# Patient Record
Sex: MALE | Race: WHITE | NOT HISPANIC OR LATINO | Employment: FULL TIME | URBAN - METROPOLITAN AREA
[De-identification: names, ages, dates, MRNs, and addresses within clinical notes are randomized per-mention and may not be internally consistent; named-entity substitution may affect disease eponyms.]

---

## 2023-09-15 PROBLEM — E78.00 ELEVATED CHOLESTEROL: Status: ACTIVE | Noted: 2023-09-15

## 2023-09-15 PROBLEM — R29.810 FACIAL WEAKNESS: Status: ACTIVE | Noted: 2023-09-15

## 2023-09-15 PROBLEM — E10.649 HYPOGLYCEMIA UNAWARENESS ASSOCIATED WITH TYPE 1 DIABETES MELLITUS (MULTI): Status: ACTIVE | Noted: 2023-09-15

## 2023-09-15 PROBLEM — F17.200 CURRENT SMOKER: Status: ACTIVE | Noted: 2023-09-15

## 2023-09-15 PROBLEM — E06.3 AUTOIMMUNE THYROIDITIS: Status: ACTIVE | Noted: 2023-09-15

## 2023-09-15 PROBLEM — E66.8 OTHER OBESITY: Status: ACTIVE | Noted: 2023-09-15

## 2023-09-15 PROBLEM — E10.42 TYPE 1 DIABETES MELLITUS WITH DIABETIC POLYNEUROPATHY (MULTI): Status: ACTIVE | Noted: 2023-09-15

## 2023-09-15 PROBLEM — R20.0 FACIAL NUMBNESS: Status: ACTIVE | Noted: 2023-09-15

## 2023-09-15 PROBLEM — E66.89 OTHER OBESITY: Status: ACTIVE | Noted: 2023-09-15

## 2023-09-15 RX ORDER — INSULIN ASPART 100 [IU]/ML
INJECTION, SOLUTION INTRAVENOUS; SUBCUTANEOUS
COMMUNITY
Start: 2021-08-11 | End: 2023-10-02 | Stop reason: CLARIF

## 2023-09-15 RX ORDER — NAPROXEN SODIUM 220 MG/1
TABLET, FILM COATED ORAL
COMMUNITY

## 2023-09-15 RX ORDER — BLOOD-GLUCOSE TRANSMITTER
EACH MISCELLANEOUS
COMMUNITY
Start: 2023-04-04 | End: 2024-04-01

## 2023-09-15 RX ORDER — LEVOTHYROXINE SODIUM 200 UG/1
TABLET ORAL
COMMUNITY
Start: 2023-01-27

## 2023-09-15 RX ORDER — BENAZEPRIL HYDROCHLORIDE 10 MG/1
TABLET ORAL
COMMUNITY

## 2023-09-15 RX ORDER — SILDENAFIL 100 MG/1
TABLET, FILM COATED ORAL
COMMUNITY

## 2023-09-15 RX ORDER — BLOOD-GLUCOSE SENSOR
EACH MISCELLANEOUS
COMMUNITY
Start: 2023-04-04

## 2023-09-15 RX ORDER — INSULIN DEGLUDEC 200 U/ML
INJECTION, SOLUTION SUBCUTANEOUS
COMMUNITY
End: 2024-01-08 | Stop reason: WASHOUT

## 2023-09-15 RX ORDER — ATORVASTATIN CALCIUM 40 MG/1
TABLET, FILM COATED ORAL
COMMUNITY
End: 2024-01-02

## 2023-09-15 RX ORDER — BLOOD-GLUCOSE SENSOR
EACH MISCELLANEOUS
COMMUNITY
Start: 2023-04-10 | End: 2024-04-29

## 2023-09-15 RX ORDER — BLOOD-GLUCOSE,RECEIVER,CONT
EACH MISCELLANEOUS
COMMUNITY
Start: 2023-03-30 | End: 2024-01-08 | Stop reason: WASHOUT

## 2023-10-01 DIAGNOSIS — Z79.4 TYPE 2 DIABETES MELLITUS WITH OTHER SPECIFIED COMPLICATION, WITH LONG-TERM CURRENT USE OF INSULIN (MULTI): Primary | ICD-10-CM

## 2023-10-01 DIAGNOSIS — E11.69 TYPE 2 DIABETES MELLITUS WITH OTHER SPECIFIED COMPLICATION, WITH LONG-TERM CURRENT USE OF INSULIN (MULTI): Primary | ICD-10-CM

## 2023-10-02 RX ORDER — INSULIN ASPART 100 [IU]/ML
INJECTION, SOLUTION INTRAVENOUS; SUBCUTANEOUS
Qty: 30 ML | Refills: 5 | Status: SHIPPED | OUTPATIENT
Start: 2023-10-02 | End: 2023-10-08 | Stop reason: CLARIF

## 2023-10-07 DIAGNOSIS — E10.42 TYPE 1 DIABETES MELLITUS WITH DIABETIC POLYNEUROPATHY (MULTI): Primary | ICD-10-CM

## 2023-10-08 RX ORDER — INSULIN ASPART 100 [IU]/ML
INJECTION, SOLUTION INTRAVENOUS; SUBCUTANEOUS
Qty: 15 ML | Refills: 7 | Status: SHIPPED | OUTPATIENT
Start: 2023-10-08 | End: 2023-10-13 | Stop reason: ALTCHOICE

## 2023-10-13 DIAGNOSIS — E10.42 TYPE 1 DIABETES MELLITUS WITH DIABETIC POLYNEUROPATHY (MULTI): ICD-10-CM

## 2023-10-13 DIAGNOSIS — E10.42 TYPE 1 DIABETES MELLITUS WITH DIABETIC POLYNEUROPATHY (MULTI): Primary | ICD-10-CM

## 2023-10-13 RX ORDER — INSULIN LISPRO 100 [IU]/ML
INJECTION, SOLUTION INTRAVENOUS; SUBCUTANEOUS
Qty: 30 ML | Refills: 3 | Status: SHIPPED | OUTPATIENT
Start: 2023-10-13 | End: 2023-10-13

## 2023-10-13 RX ORDER — INSULIN LISPRO 100 [IU]/ML
INJECTION, SOLUTION INTRAVENOUS; SUBCUTANEOUS
Qty: 30 ML | Refills: 3 | Status: SHIPPED | OUTPATIENT
Start: 2023-10-13

## 2023-12-29 DIAGNOSIS — E78.5 HYPERLIPIDEMIA, UNSPECIFIED HYPERLIPIDEMIA TYPE: Primary | ICD-10-CM

## 2024-01-02 RX ORDER — ATORVASTATIN CALCIUM 40 MG/1
40 TABLET, FILM COATED ORAL DAILY
Qty: 90 TABLET | Refills: 1 | Status: SHIPPED | OUTPATIENT
Start: 2024-01-02

## 2024-01-03 NOTE — PROGRESS NOTES
HPI:  Here for follow up/metabolic management 49 yo with DM Type 1 diagnosed 2008. History HTN, ED, Hypothyroid currently taking 200 mcg 7 pills a week, current smoker. A1C 9.5% was 9.2% today. CGM data suggests lower A1c. Patient testing sugars 6 times a day with Dexcom G6 sensor/Tandem Tslim pump. Struggles following carb controlled diet and knows reasonable carb allowances, plans to start diet competition at work with healthier lunches.  Patient able to afford their medications. 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           Tandem t-slim with Dexcom G6 cgm with Control IQ         Basal: 12a 1.37 u/hr (32.9 units)         ICR:         12a 1:6         4am 1:5         9am 1:6         6pm 1:5         Target 110         ISF: 12a 28           - Benazepril daily at goal         - Atorvastatin 40 mg daily LDL 99           Tconnect report downloaded and attached time in target 63% (was 42%), lows 2%. Avg bs 166. Patterns: high 100's overnight, waking mid 100's, low 200's after lunch and dinner, upper 100's bedtime. Continues to add carbs (not eating) to correct elevations.    TDD:   87.77 units  Sleep mode: set  Exercise mode: not using, reviewed use  Infusion sets: alfredo steel, rotating, denies problems with sites  -has back up basal insulin at home  -pump failure protocol reviewed  -reinforced treating with 5-10 grams of fasting acting carbs for hypoglycemia  -reviewed use of extended bolus     The pros and cons, risks and benefits and mechanism of action of the GLP-1 mimetics were discussed with the patient.   The patient was instructed to not eat if not hungry.   The patient was instructed on self-administration of the injected GLP-1 mimetic.     /75 (BP Location: Right arm, Patient Position: Sitting, BP Cuff Size: Large adult)   Pulse 79   Wt 119 kg (261 lb 9.6 oz)   BMI 35.48 kg/m²     Current Outpatient Medications:     aspirin 81 mg chewable tablet, , Disp: , Rfl:     atorvastatin  (Lipitor) 40 mg tablet, TAKE 1 TABLET ONCE DAILY, Disp: 90 tablet, Rfl: 1    benazepril (Lotensin) 10 mg tablet, , Disp: , Rfl:     blood-glucose sensor (Dexcom G6 Sensor) device, , Disp: , Rfl:     Dexcom G4 platinum transmitter (Dexcom G6 Transmitter) device, , Disp: , Rfl:     ergocalciferol, vitamin D2, (VITAMIN D2 ORAL), , Disp: , Rfl:     insulin lispro (HumaLOG) 100 unit/mL injection, TAKE AS DIRECTED PER INSULIN INSTRUCTIONS., Disp: 30 mL, Rfl: 3    levothyroxine (Synthroid) 200 mcg tablet, , Disp: , Rfl:     sildenafil (Viagra) 100 mg tablet, , Disp: , Rfl:     blood-glucose sensor (Dexcom G7 Sensor) device, , Disp: , Rfl:   Labs:  Lab Results   Component Value Date    WBC 11.5 (H) 03/15/2023    NRBC 0 03/15/2023    RBC 5.10 03/15/2023    HGB 16.0 03/15/2023    HCT 47.4 03/15/2023     03/15/2023     Lab Results   Component Value Date    CALCIUM 9.2 03/15/2023    AST 13 03/15/2023    ALKPHOS 147 (H) 03/15/2023    BILITOT 0.5 03/15/2023    PROT 7.0 03/15/2023    ALBUMIN 3.8 03/15/2023    GLOB 3.2 03/15/2023    AGR 1.2 (L) 03/15/2023     03/15/2023    K 4.4 03/15/2023     03/15/2023    CO2 23 (L) 03/15/2023    ANIONGAP 14 03/15/2023    BUN 13 03/15/2023    CREATININE 0.7 03/15/2023    UREACREAUR 18.6 03/15/2023    GLUCOSE 140 (H) 03/15/2023    ALT 17 03/15/2023    EGFR 114 03/15/2023     Lab Results   Component Value Date    CHOL 149 03/15/2023    TRIG 67 03/15/2023    HDL 37 (L) 03/15/2023    LDLCALC 99 03/15/2023     Lab Results   Component Value Date    MICROALBCREA 19.7 03/15/2023     Lab Results   Component Value Date    TSH 1.93 03/15/2023         Assessment and Plan:    1. Type 1 diabetes mellitus with diabetic polyneuropathy (CMS/HCC)  -postprandial hyperglycemia: increase carb ratio 9 am to 1:5, increase carb ratio 6 pm to 4.5  -not coming to target with current correction, increase CF to 20  -reinforced carb limits at meals  -reinforced allowing pump to correct blood sugars, avoid  entering carbs for correction  -reinforced smoking cessation  -start mounjaro for weight management, CV risk reduction    2. Elevated cholesterol  -on statin and toleraing    3. Autoimmune thyroiditis  -euthyroid on current meds    4. Primary hypertension  -at target on current therapy    Medical Decision Making  Complexity of problem: Chronic illness of diabetes mellitus uncontrolled, progressing  Data analyzed and reviewed: Reviewed prior notes, blood glucose data, labs including HgbA1c, lipids, serum chemistries.  Ordered tests.   Risk of complications and morbidities: Is definite because of use of insulin and risk of hypoglycemia.  Prescription medications reviewed and modifications made.  Compliance assessed.  Addressed social determinants of health including food insecurity.      Treatment and plan discussed with Dr. Still. Jennifer OCHOA Certified Diabetes [facility] and

## 2024-01-08 ENCOUNTER — CLINICAL SUPPORT (OUTPATIENT)
Dept: ENDOCRINOLOGY | Facility: CLINIC | Age: 49
End: 2024-01-08
Payer: COMMERCIAL

## 2024-01-08 VITALS
WEIGHT: 261.6 LBS | DIASTOLIC BLOOD PRESSURE: 75 MMHG | BODY MASS INDEX: 35.48 KG/M2 | SYSTOLIC BLOOD PRESSURE: 122 MMHG | HEART RATE: 79 BPM

## 2024-01-08 DIAGNOSIS — E10.42 TYPE 1 DIABETES MELLITUS WITH DIABETIC POLYNEUROPATHY (MULTI): Primary | ICD-10-CM

## 2024-01-08 DIAGNOSIS — I10 PRIMARY HYPERTENSION: ICD-10-CM

## 2024-01-08 DIAGNOSIS — E06.3 AUTOIMMUNE THYROIDITIS: ICD-10-CM

## 2024-01-08 DIAGNOSIS — E78.00 ELEVATED CHOLESTEROL: ICD-10-CM

## 2024-01-08 LAB — POC HEMOGLOBIN A1C: 9.5 % (ref 4.2–6.5)

## 2024-01-08 PROCEDURE — 83036 HEMOGLOBIN GLYCOSYLATED A1C: CPT | Performed by: INTERNAL MEDICINE

## 2024-01-08 PROCEDURE — 99214 OFFICE O/P EST MOD 30 MIN: CPT | Performed by: INTERNAL MEDICINE

## 2024-01-08 PROCEDURE — 95251 CONT GLUC MNTR ANALYSIS I&R: CPT | Performed by: INTERNAL MEDICINE

## 2024-01-08 RX ORDER — INSULIN ASPART 100 [IU]/ML
INJECTION, SOLUTION INTRAVENOUS; SUBCUTANEOUS
Qty: 90 ML | Refills: 1 | Status: SHIPPED | OUTPATIENT
Start: 2024-01-08

## 2024-01-08 RX ORDER — TIRZEPATIDE 5 MG/.5ML
5 INJECTION, SOLUTION SUBCUTANEOUS
Qty: 2 ML | Refills: 5 | Status: SHIPPED | OUTPATIENT
Start: 2024-01-08

## 2024-01-08 RX ORDER — TIRZEPATIDE 2.5 MG/.5ML
2.5 INJECTION, SOLUTION SUBCUTANEOUS
Qty: 2 ML | Refills: 0 | Status: SHIPPED | OUTPATIENT
Start: 2024-01-08 | End: 2024-02-06

## 2024-01-08 ASSESSMENT — PAIN SCALES - GENERAL: PAINLEVEL: 0-NO PAIN

## 2024-01-08 NOTE — PATIENT INSTRUCTIONS
See pump settings page for changes we made today    Start  Mounjaro 2.5 mg weekly for 4 weeks  Then   Mounjaro 5mg weekly    Let the pump correct you when high, avoid entering carbs for correction

## 2024-01-08 NOTE — PROGRESS NOTES
I, Dr Girish Still, have reviewed this progress note, medication list, vital signs, any pertinent lab values, and any CGM data if present with the Certified Diabetes Care and  face to face during this visit today. This note reflects the treatment plan that was made under my direction after reviewing the above mentioned elements while face to face with the patient and CDE.  I personally answered and addressed any questions and concerns the patient had during the visit today.  The CDE entered the data in this note under my direction and I personally reviewed it, signed any lab or medication orders that I instructed to be completed. I am the billing provider for this visit and the level of service was determined by my involvement in the Medical Decision Making Component of this visit while face to face with the patient.    Girish Still MD

## 2024-01-30 DIAGNOSIS — E10.42 TYPE 1 DIABETES MELLITUS WITH DIABETIC POLYNEUROPATHY (MULTI): Primary | ICD-10-CM

## 2024-01-30 RX ORDER — INSULIN ASPART 100 [IU]/ML
INJECTION, SOLUTION INTRAVENOUS; SUBCUTANEOUS
Qty: 30 ML | Refills: 11 | Status: SHIPPED | OUTPATIENT
Start: 2024-01-30 | End: 2024-02-05

## 2024-02-05 DIAGNOSIS — E10.42 TYPE 1 DIABETES MELLITUS WITH DIABETIC POLYNEUROPATHY (MULTI): ICD-10-CM

## 2024-02-05 RX ORDER — INSULIN ASPART 100 [IU]/ML
INJECTION, SOLUTION INTRAVENOUS; SUBCUTANEOUS
Qty: 60 ML | Refills: 11 | Status: SHIPPED | OUTPATIENT
Start: 2024-02-05

## 2024-02-06 DIAGNOSIS — E10.42 TYPE 1 DIABETES MELLITUS WITH DIABETIC POLYNEUROPATHY (MULTI): ICD-10-CM

## 2024-02-06 RX ORDER — TIRZEPATIDE 2.5 MG/.5ML
2.5 INJECTION, SOLUTION SUBCUTANEOUS
Qty: 2 ML | Refills: 6 | Status: SHIPPED | OUTPATIENT
Start: 2024-02-06

## 2024-03-30 DIAGNOSIS — E10.42 TYPE 1 DIABETES MELLITUS WITH DIABETIC POLYNEUROPATHY (MULTI): Primary | ICD-10-CM

## 2024-04-01 RX ORDER — BLOOD-GLUCOSE TRANSMITTER
EACH MISCELLANEOUS
Qty: 1 EACH | Refills: 3 | Status: SHIPPED | OUTPATIENT
Start: 2024-04-01

## 2024-04-12 ENCOUNTER — TELEPHONE (OUTPATIENT)
Dept: ENDOCRINOLOGY | Facility: CLINIC | Age: 49
End: 2024-04-12

## 2024-04-15 ENCOUNTER — APPOINTMENT (OUTPATIENT)
Dept: ENDOCRINOLOGY | Facility: CLINIC | Age: 49
End: 2024-04-15

## 2024-04-27 DIAGNOSIS — E10.42 TYPE 1 DIABETES MELLITUS WITH DIABETIC POLYNEUROPATHY (MULTI): Primary | ICD-10-CM

## 2024-04-29 RX ORDER — BLOOD-GLUCOSE SENSOR
EACH MISCELLANEOUS
Qty: 10 EACH | Refills: 2 | Status: SHIPPED | OUTPATIENT
Start: 2024-04-29

## 2024-07-13 DIAGNOSIS — E10.42 TYPE 1 DIABETES MELLITUS WITH DIABETIC POLYNEUROPATHY (MULTI): ICD-10-CM

## 2024-07-15 DIAGNOSIS — E10.42 TYPE 1 DIABETES MELLITUS WITH DIABETIC POLYNEUROPATHY (MULTI): ICD-10-CM

## 2024-07-15 RX ORDER — BLOOD-GLUCOSE SENSOR
EACH MISCELLANEOUS
Qty: 9 EACH | Refills: 3 | Status: SHIPPED | OUTPATIENT
Start: 2024-07-15

## 2024-07-15 RX ORDER — INSULIN ASPART 100 [IU]/ML
INJECTION, SOLUTION INTRAVENOUS; SUBCUTANEOUS
Qty: 30 ML | Refills: 5 | Status: SHIPPED | OUTPATIENT
Start: 2024-07-15

## 2024-07-26 RX ORDER — BENAZEPRIL HYDROCHLORIDE 10 MG/1
10 TABLET ORAL DAILY
Qty: 90 TABLET | Refills: 3 | OUTPATIENT
Start: 2024-07-26

## 2024-08-27 DIAGNOSIS — E78.5 HYPERLIPIDEMIA, UNSPECIFIED HYPERLIPIDEMIA TYPE: ICD-10-CM

## 2024-08-27 RX ORDER — LEVOTHYROXINE SODIUM 200 UG/1
TABLET ORAL
Qty: 90 TABLET | Refills: 3 | OUTPATIENT
Start: 2024-08-27

## 2024-08-27 RX ORDER — ATORVASTATIN CALCIUM 40 MG/1
40 TABLET, FILM COATED ORAL DAILY
Qty: 90 TABLET | Refills: 1 | Status: SHIPPED | OUTPATIENT
Start: 2024-08-27

## 2024-09-12 DIAGNOSIS — E06.3 AUTOIMMUNE THYROIDITIS: Primary | ICD-10-CM

## 2024-09-12 RX ORDER — LEVOTHYROXINE SODIUM 200 UG/1
TABLET ORAL
Qty: 90 TABLET | Refills: 3 | Status: SHIPPED | OUTPATIENT
Start: 2024-09-12

## 2024-11-06 NOTE — PROGRESS NOTES
HPI   48 yo with DM Type 1 diagnosed 2008. History HTN, ED, current smoker. A1C 9.2% last visit, today 8.5% .     Patient testing sugars 6 times a day with Dexcom G6 sensor/Tandem Tslim pump. Improving following carb controlled diet and knows reasonable carb allowances.  Patient able to afford their medications. 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            Tandem t-slim with Dexcom G6 cgm with Control IQ         Basal: 12a 1.37 u/hr          ICR:         12a 1:6         4am 1:5         9am 1:5         6pm 1:4.5         Target 110         ISF: 12a 20    -gave trial of mounjaro-had gi side effects      30 day dexcom data: 66% in range, 4% low, pattern: mid/upper 100's through the day, upper 100's on waking and through most of the day.  -going out for most lunches  -not always bolusing at meals          - Benazepril  10mg daily at goal           - Atorvastatin 40 mg daily     -Hypothyroid currently taking 200 mcg 7 pills a week                /75 (BP Location: Right arm, Patient Position: Sitting, BP Cuff Size: Large adult)   Pulse 79   Wt 119 kg (261 lb 9.6 oz)   BMI 35.48 kg/m²           Current Outpatient Medications:     aspirin 81 mg chewable tablet, , Disp: , Rfl:     atorvastatin (Lipitor) 40 mg tablet, TAKE 1 TABLET ONCE DAILY, Disp: 90 tablet, Rfl: 1    blood-glucose sensor (Dexcom G6 Sensor) device, USE AS DIRECTED, Disp: 9 each, Rfl: 3    blood-glucose sensor (Dexcom G7 Sensor) device, , Disp: , Rfl:     blood-glucose transmitter device (Dexcom G6 Transmitter) device, AS DIRECTED EVERY 90 DAYS, Disp: 1 each, Rfl: 3    ergocalciferol, vitamin D2, (VITAMIN D2 ORAL), , Disp: , Rfl:     sildenafil (Viagra) 100 mg tablet, , Disp: , Rfl:     Synthroid 200 mcg tablet, TAKE 1 TABLET ONCE DAILY INTHE MORNING ON AN EMPTY    STOMACH, Disp: 90 tablet, Rfl: 3    benazepril (Lotensin) 10 mg tablet, Take 1 tablet (10 mg) by mouth once daily., Disp: 90 tablet, Rfl: 3    insulin aspart  "(NovoLOG U-100 Insulin aspart) 100 unit/mL injection, TAKE AS DIRECTED UP  UNITS DAILY USING INSULIN PUMP, Disp: 90 mL, Rfl: 3    tirzepatide (Mounjaro) 2.5 mg/0.5 mL pen injector, INJECT 2.5 MG SUBCUTANEOUSLY EVERY 7 DAYS (Patient not taking: Reported on 11/7/2024), Disp: 2 mL, Rfl: 6    tirzepatide (Mounjaro) 5 mg/0.5 mL pen injector, Inject 5 mg under the skin 1 (one) time per week. (Patient not taking: Reported on 11/7/2024), Disp: 2 mL, Rfl: 5      Allergies as of 11/07/2024    (No Known Allergies)         Review of Systems   Cardiology: Lightheadedness-denies.  Chest pain-denies.  Leg edema-denies.  Palpitations-denies.  Respiratory: Cough-denies. Shortness of breath-denies.  Wheezing-denies.  Gastroenterology: Constipation-denies.  Diarrhea-denies.  Heartburn-denies.  Endocrinology: Cold intolerance-denies.  Heat intolerance-denies.  Sweats-denies.  Neurology: Headache-denies.  Tremor-denies.  Neuropathy in extremities-denies.  Psychology: Low energy-denies.  Irritability-denies.  Sleep disturbances-denies.      /74   Pulse 72   Ht 1.88 m (6' 2\")   Wt 114 kg (252 lb)   BMI 32.35 kg/m²       Labs:  Lab Results   Component Value Date    WBC 11.5 (H) 03/15/2023    NRBC 0 03/15/2023    RBC 5.10 03/15/2023    HGB 16.0 03/15/2023    HCT 47.4 03/15/2023     03/15/2023     Lab Results   Component Value Date    CALCIUM 9.2 03/15/2023    AST 13 03/15/2023    ALKPHOS 147 (H) 03/15/2023    BILITOT 0.5 03/15/2023    PROT 7.0 03/15/2023    ALBUMIN 3.8 03/15/2023    GLOB 3.2 03/15/2023    AGR 1.2 (L) 03/15/2023     03/15/2023    K 4.4 03/15/2023     03/15/2023    CO2 23 (L) 03/15/2023    ANIONGAP 14 03/15/2023    BUN 13 03/15/2023    CREATININE 0.7 03/15/2023    UREACREAUR 18.6 03/15/2023    GLUCOSE 140 (H) 03/15/2023    ALT 17 03/15/2023    EGFR 114 03/15/2023     Lab Results   Component Value Date    CHOL 149 03/15/2023    TRIG 67 03/15/2023    HDL 37 (L) 03/15/2023    LDLCALC 99 " "03/15/2023     Lab Results   Component Value Date    MICROALBCREA 19.7 03/15/2023     Lab Results   Component Value Date    TSH 1.93 03/15/2023     No results found for: \"[id number]\"  Lab Results   Component Value Date    HGBA1C 8.5 (A) 11/07/2024         Physical Exam   General Appearance: pleasant, cooperative, no acute distress  HEENT: no chemosis, no proptosis, no lid lag, no lid retraction  Neck: no lymphadenopathy, no thyromegaly, no dominant thyroid nodules  Heart: no murmurs, regular rate and rhythm, S1 and S2  Lungs: no wheezes, no rhonci, no rales  Extremities: no lower extremity swelling      Assessment/Plan   1. Type 1 diabetes mellitus with diabetic polyneuropathy (Primary)  -A1c ordered and reviewed  -dexcom data reviewed  -labs reviewed/labs ordered    -needs to bolus with meals  -would be better if he packed lunch  -overall settings are reasonable    2. Elevated cholesterol  -on statin and tolerating, repeat labs    3. Autoimmune thyroiditis  -euthyroid on therapy, please repeat labs    4. Primary hypertension  -at target on therapy, will follow, no change in meds         Follow Up:  nestor Kruse 4 months    Medical Decision Making  Complexity of problem: Chronic illness of diabetes mellitus uncontrolled, progressing  Data analyzed and reviewed: Reviewed prior notes, blood glucose data, labs including HgbA1c, lipids, serum chemistries.  Ordered tests.   Risk of complications and morbidities: Is definite because of use of insulin and risk of hypoglycemia.  Prescription medications reviewed and modifications made.  Compliance assessed.  Addressed social determinants of health including food insecurity.       "

## 2024-11-07 ENCOUNTER — APPOINTMENT (OUTPATIENT)
Dept: ENDOCRINOLOGY | Facility: CLINIC | Age: 49
End: 2024-11-07

## 2024-11-07 VITALS
BODY MASS INDEX: 32.34 KG/M2 | SYSTOLIC BLOOD PRESSURE: 122 MMHG | HEIGHT: 74 IN | DIASTOLIC BLOOD PRESSURE: 74 MMHG | WEIGHT: 252 LBS | HEART RATE: 72 BPM

## 2024-11-07 DIAGNOSIS — I10 PRIMARY HYPERTENSION: ICD-10-CM

## 2024-11-07 DIAGNOSIS — E06.3 AUTOIMMUNE THYROIDITIS: ICD-10-CM

## 2024-11-07 DIAGNOSIS — E10.42 TYPE 1 DIABETES MELLITUS WITH DIABETIC POLYNEUROPATHY: Primary | ICD-10-CM

## 2024-11-07 DIAGNOSIS — E78.00 ELEVATED CHOLESTEROL: ICD-10-CM

## 2024-11-07 LAB — POC HEMOGLOBIN A1C: 8.5 % (ref 4.2–6.5)

## 2024-11-07 PROCEDURE — 3074F SYST BP LT 130 MM HG: CPT | Performed by: INTERNAL MEDICINE

## 2024-11-07 PROCEDURE — 99214 OFFICE O/P EST MOD 30 MIN: CPT | Performed by: INTERNAL MEDICINE

## 2024-11-07 PROCEDURE — 3078F DIAST BP <80 MM HG: CPT | Performed by: INTERNAL MEDICINE

## 2024-11-07 PROCEDURE — 3008F BODY MASS INDEX DOCD: CPT | Performed by: INTERNAL MEDICINE

## 2024-11-07 PROCEDURE — 4004F PT TOBACCO SCREEN RCVD TLK: CPT | Performed by: INTERNAL MEDICINE

## 2024-11-07 PROCEDURE — 83036 HEMOGLOBIN GLYCOSYLATED A1C: CPT | Performed by: INTERNAL MEDICINE

## 2024-11-07 PROCEDURE — 4010F ACE/ARB THERAPY RXD/TAKEN: CPT | Performed by: INTERNAL MEDICINE

## 2024-11-07 RX ORDER — INSULIN ASPART 100 [IU]/ML
INJECTION, SOLUTION INTRAVENOUS; SUBCUTANEOUS
Qty: 90 ML | Refills: 3 | Status: SHIPPED | OUTPATIENT
Start: 2024-11-07

## 2024-11-07 RX ORDER — BENAZEPRIL HYDROCHLORIDE 10 MG/1
10 TABLET ORAL DAILY
Qty: 90 TABLET | Refills: 3 | Status: SHIPPED | OUTPATIENT
Start: 2024-11-07

## 2024-11-07 ASSESSMENT — ENCOUNTER SYMPTOMS
DEPRESSION: 0
OCCASIONAL FEELINGS OF UNSTEADINESS: 0
LOSS OF SENSATION IN FEET: 0

## 2024-11-07 ASSESSMENT — LIFESTYLE VARIABLES
AUDIT-C TOTAL SCORE: 0
HOW OFTEN DO YOU HAVE SIX OR MORE DRINKS ON ONE OCCASION: NEVER
SKIP TO QUESTIONS 9-10: 1
HOW OFTEN DO YOU HAVE A DRINK CONTAINING ALCOHOL: NEVER
HOW MANY STANDARD DRINKS CONTAINING ALCOHOL DO YOU HAVE ON A TYPICAL DAY: PATIENT DOES NOT DRINK

## 2024-11-07 ASSESSMENT — PATIENT HEALTH QUESTIONNAIRE - PHQ9
1. LITTLE INTEREST OR PLEASURE IN DOING THINGS: NOT AT ALL
SUM OF ALL RESPONSES TO PHQ9 QUESTIONS 1 & 2: 0
2. FEELING DOWN, DEPRESSED OR HOPELESS: NOT AT ALL

## 2024-11-07 ASSESSMENT — PAIN SCALES - GENERAL: PAINLEVEL_OUTOF10: 0-NO PAIN

## 2024-11-08 ENCOUNTER — LAB (OUTPATIENT)
Dept: LAB | Facility: LAB | Age: 49
End: 2024-11-08
Payer: COMMERCIAL

## 2024-11-08 DIAGNOSIS — E78.00 ELEVATED CHOLESTEROL: ICD-10-CM

## 2024-11-08 DIAGNOSIS — E10.42 TYPE 1 DIABETES MELLITUS WITH DIABETIC POLYNEUROPATHY: ICD-10-CM

## 2024-11-08 DIAGNOSIS — E06.3 AUTOIMMUNE THYROIDITIS: ICD-10-CM

## 2024-11-08 LAB
ALBUMIN SERPL BCP-MCNC: 3.9 G/DL (ref 3.4–5)
ALP SERPL-CCNC: 136 U/L (ref 33–120)
ALT SERPL W P-5'-P-CCNC: 19 U/L (ref 10–52)
ANION GAP SERPL CALC-SCNC: 10 MMOL/L (ref 10–20)
AST SERPL W P-5'-P-CCNC: 17 U/L (ref 9–39)
BASOPHILS # BLD AUTO: 0.06 X10*3/UL (ref 0–0.1)
BASOPHILS NFR BLD AUTO: 0.7 %
BILIRUB SERPL-MCNC: 0.4 MG/DL (ref 0–1.2)
BUN SERPL-MCNC: 12 MG/DL (ref 6–23)
CALCIUM SERPL-MCNC: 9.1 MG/DL (ref 8.6–10.3)
CHLORIDE SERPL-SCNC: 106 MMOL/L (ref 98–107)
CHOLEST SERPL-MCNC: 139 MG/DL (ref 0–199)
CHOLESTEROL/HDL RATIO: 3.5
CO2 SERPL-SCNC: 29 MMOL/L (ref 21–32)
CREAT SERPL-MCNC: 0.81 MG/DL (ref 0.5–1.3)
CREAT UR-MCNC: 167.8 MG/DL (ref 20–370)
EGFRCR SERPLBLD CKD-EPI 2021: >90 ML/MIN/1.73M*2
EOSINOPHIL # BLD AUTO: 0.15 X10*3/UL (ref 0–0.7)
EOSINOPHIL NFR BLD AUTO: 1.8 %
ERYTHROCYTE [DISTWIDTH] IN BLOOD BY AUTOMATED COUNT: 12.8 % (ref 11.5–14.5)
GLUCOSE SERPL-MCNC: 155 MG/DL (ref 74–99)
HCT VFR BLD AUTO: 46.5 % (ref 41–52)
HDLC SERPL-MCNC: 39.9 MG/DL
HGB BLD-MCNC: 15.4 G/DL (ref 13.5–17.5)
IMM GRANULOCYTES # BLD AUTO: 0.01 X10*3/UL (ref 0–0.7)
IMM GRANULOCYTES NFR BLD AUTO: 0.1 % (ref 0–0.9)
LDLC SERPL CALC-MCNC: 90 MG/DL
LYMPHOCYTES # BLD AUTO: 2.35 X10*3/UL (ref 1.2–4.8)
LYMPHOCYTES NFR BLD AUTO: 29 %
MCH RBC QN AUTO: 30.9 PG (ref 26–34)
MCHC RBC AUTO-ENTMCNC: 33.1 G/DL (ref 32–36)
MCV RBC AUTO: 93 FL (ref 80–100)
MICROALBUMIN UR-MCNC: 38 MG/L
MICROALBUMIN/CREAT UR: 22.6 UG/MG CREAT
MONOCYTES # BLD AUTO: 0.65 X10*3/UL (ref 0.1–1)
MONOCYTES NFR BLD AUTO: 8 %
NEUTROPHILS # BLD AUTO: 4.89 X10*3/UL (ref 1.2–7.7)
NEUTROPHILS NFR BLD AUTO: 60.4 %
NON HDL CHOLESTEROL: 99 MG/DL (ref 0–149)
NRBC BLD-RTO: 0 /100 WBCS (ref 0–0)
PLATELET # BLD AUTO: 334 X10*3/UL (ref 150–450)
POTASSIUM SERPL-SCNC: 4 MMOL/L (ref 3.5–5.3)
PROT SERPL-MCNC: 6.6 G/DL (ref 6.4–8.2)
RBC # BLD AUTO: 4.98 X10*6/UL (ref 4.5–5.9)
SODIUM SERPL-SCNC: 141 MMOL/L (ref 136–145)
TRIGL SERPL-MCNC: 46 MG/DL (ref 0–149)
TSH SERPL-ACNC: 1.25 MIU/L (ref 0.44–3.98)
VLDL: 9 MG/DL (ref 0–40)
WBC # BLD AUTO: 8.1 X10*3/UL (ref 4.4–11.3)

## 2024-11-08 PROCEDURE — 36415 COLL VENOUS BLD VENIPUNCTURE: CPT

## 2024-11-08 PROCEDURE — 82570 ASSAY OF URINE CREATININE: CPT

## 2024-11-08 PROCEDURE — 82043 UR ALBUMIN QUANTITATIVE: CPT

## 2024-11-25 ENCOUNTER — TELEPHONE (OUTPATIENT)
Dept: ENDOCRINOLOGY | Facility: CLINIC | Age: 49
End: 2024-11-25
Payer: COMMERCIAL

## 2024-11-25 NOTE — TELEPHONE ENCOUNTER
Called patient left a message to call the office, in regarding to confirming pharmacy for prescription refill.

## 2024-12-02 DIAGNOSIS — E10.42 TYPE 1 DIABETES MELLITUS WITH DIABETIC POLYNEUROPATHY: ICD-10-CM

## 2024-12-02 RX ORDER — BENAZEPRIL HYDROCHLORIDE 10 MG/1
10 TABLET ORAL DAILY
Qty: 90 TABLET | Refills: 3 | Status: SHIPPED | OUTPATIENT
Start: 2024-12-02

## 2024-12-02 RX ORDER — INSULIN ASPART 100 [IU]/ML
INJECTION, SOLUTION INTRAVENOUS; SUBCUTANEOUS
Qty: 90 ML | Refills: 3 | Status: SHIPPED | OUTPATIENT
Start: 2024-12-02

## 2024-12-02 NOTE — TELEPHONE ENCOUNTER
Called Pharmacy, Spoke with pharmacy staff patient picked up Benazepril and Novolog  from [facility] pharmacy.

## 2025-01-30 DIAGNOSIS — E78.5 HYPERLIPIDEMIA, UNSPECIFIED HYPERLIPIDEMIA TYPE: ICD-10-CM

## 2025-01-30 RX ORDER — ATORVASTATIN CALCIUM 40 MG/1
40 TABLET, FILM COATED ORAL DAILY
Qty: 90 TABLET | Refills: 1 | Status: SHIPPED | OUTPATIENT
Start: 2025-01-30

## 2025-03-12 NOTE — PROGRESS NOTES
HPI   49 yo with DM Type 1 diagnosed 2008. History HTN, ED, current smoker.    A1c 8.5% last visit, today 8.7% .     Patient testing sugars 6 times a day with Dexcom G6 sensor. Doing better following a carb controlled diet and knows reasonable carb allowances.  Patient able to afford their medications. 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    Tandem t-slim with control IQ, Dexcom G6  Lantus U100 solostar pen, lot # [id number], exp 2025/11/30  for pump failure     Time  Basal Rate (u/hour)  12:00 AM  1.37              Total Daily Basal:           32.88 units           Time  Correction Factor (mg/dl)  12:00 AM  20    Time  Carb Ratio (unit:grams)  12:00 AM   1:6   4:00 AM   1:5   6:00 PM  1:4.5    Insulin Summary  Average Total Daily Dose 78.95 units/day  Basal 47%   37.45 units  Bolus 53 %    -infusion sets: alfredo steel, changing every 3 days, denies site issues; supplies from [employer]    -gave trial of mounjaro-had gi side effects      30 day dexcom data: 67% in range, 1.7% low, pattern: upper 100's overnight and waking, upper 100's through most of the day, low 200's after dinner and in to bedtime.   -not always bolusing at meals        -taking benazepril 10mg daily for htn            -taking atorvastatin 40 mg daily for lipids, tolerating     -hypothyroid, currently taking 200 mcg 7 pills a week              Current Outpatient Medications:     aspirin 81 mg chewable tablet, , Disp: , Rfl:     atorvastatin (Lipitor) 40 mg tablet, TAKE 1 TABLET ONCE DAILY, Disp: 90 tablet, Rfl: 1    benazepril (Lotensin) 10 mg tablet, Take 1 tablet (10 mg) by mouth once daily., Disp: 90 tablet, Rfl: 3    blood-glucose sensor (Dexcom G6 Sensor) device, USE AS DIRECTED, Disp: 9 each, Rfl: 3    blood-glucose sensor (Dexcom G7 Sensor) device, , Disp: , Rfl:     blood-glucose transmitter device (Dexcom G6 Transmitter) device, AS DIRECTED EVERY 90 DAYS, Disp: 1 each, Rfl: 3    ergocalciferol, vitamin D2, (VITAMIN D2 ORAL), ,  "Disp: , Rfl:     insulin aspart (NovoLOG U-100 Insulin aspart) 100 unit/mL injection, TAKE AS DIRECTED UP  UNITS DAILY USING INSULIN PUMP, Disp: 90 mL, Rfl: 3    sildenafil (Viagra) 100 mg tablet, , Disp: , Rfl:     Synthroid 200 mcg tablet, TAKE 1 TABLET ONCE DAILY INTHE MORNING ON AN EMPTY    STOMACH, Disp: 90 tablet, Rfl: 3    Allergies as of 03/14/2025    (No Known Allergies)       /77 (BP Location: Right arm, Patient Position: Sitting)   Pulse 78   Wt 116 kg (256 lb)   BMI 32.87 kg/m²     Labs:   Lab Results   Component Value Date    WBC 8.1 11/08/2024    NRBC 0.0 11/08/2024    RBC 4.98 11/08/2024    HGB 15.4 11/08/2024    HCT 46.5 11/08/2024     11/08/2024     Lab Results   Component Value Date    CALCIUM 9.1 11/08/2024    AST 17 11/08/2024    ALKPHOS 136 (H) 11/08/2024    BILITOT 0.4 11/08/2024    PROT 6.6 11/08/2024    ALBUMIN 3.9 11/08/2024    GLOB 3.2 03/15/2023    AGR 1.2 (L) 03/15/2023     11/08/2024    K 4.0 11/08/2024     11/08/2024    CO2 29 11/08/2024    ANIONGAP 10 11/08/2024    BUN 12 11/08/2024    CREATININE 0.81 11/08/2024    UREACREAUR 18.6 03/15/2023    GLUCOSE 155 (H) 11/08/2024    ALT 19 11/08/2024    EGFR >90 11/08/2024     Lab Results   Component Value Date    CHOL 139 11/08/2024    TRIG 46 11/08/2024    HDL 39.9 11/08/2024    LDLCALC 90 11/08/2024     Lab Results   Component Value Date    MICROALBCREA 22.6 11/08/2024     Lab Results   Component Value Date    TSH 1.25 11/08/2024     No results found for: \"[id number]\"  Lab Results   Component Value Date    HGBA1C 8.7 (A) 03/14/2025         Assessment/Plan   1. Type 1 diabetes mellitus with diabetic polyneuropathy (Primary)  -A1c ordered and reviewed  -labs reviewed  -tandem source data reviewed with patient (scanned in epic), reviewed glycemic targets, reinforced looking for patterns and trends    -no improvement in A1c, was on cruise last month  -increase carb ratio at 4:00 AM to 1:4.5, 6:00 PM to " 1:4  -increase correction factor at 6:00 PM to 15    -reinforced smoking cessation  -Lantus U100 solostar pen, lot # [id number], exp 2025/11/30 provided for pump failure, pump failure protocol reviewed  -reviewed need to update pump to change to Dexcom G7, written info provided      2. Primary hypertension  -at target    3. Elevated cholesterol  -on statin and tolerating    4. Autoimmune thyroiditis  -euthyroid        Follow up: Dr. Still 6 months    -labs/tests/notes reviewed  -reviewed and counseled patient on medication monitoring and side effects    Treatment and plan discussed with Dr. Still. Jennifer RN Certified Diabetes Care and     Medical Decision Making  Complexity of problem: Chronic illness of diabetes mellitus uncontrolled, progressing  Data analyzed and reviewed: Reviewed prior notes, blood glucose data, labs including HgbA1c, lipids, serum chemistries.  Ordered tests.   Risk of complications and morbidities: Is definite because of use of insulin and risk of hypoglycemia.  Prescription medications reviewed and modifications made.  Compliance assessed.  Addressed social determinants of health including food insecurity.

## 2025-03-14 ENCOUNTER — APPOINTMENT (OUTPATIENT)
Dept: ENDOCRINOLOGY | Facility: CLINIC | Age: 50
End: 2025-03-14
Payer: COMMERCIAL

## 2025-03-14 VITALS
SYSTOLIC BLOOD PRESSURE: 122 MMHG | DIASTOLIC BLOOD PRESSURE: 77 MMHG | BODY MASS INDEX: 32.87 KG/M2 | WEIGHT: 256 LBS | HEART RATE: 78 BPM

## 2025-03-14 DIAGNOSIS — E78.00 ELEVATED CHOLESTEROL: ICD-10-CM

## 2025-03-14 DIAGNOSIS — E10.42 TYPE 1 DIABETES MELLITUS WITH DIABETIC POLYNEUROPATHY: Primary | ICD-10-CM

## 2025-03-14 DIAGNOSIS — E06.3 AUTOIMMUNE THYROIDITIS: ICD-10-CM

## 2025-03-14 DIAGNOSIS — E78.5 HYPERLIPIDEMIA, UNSPECIFIED HYPERLIPIDEMIA TYPE: ICD-10-CM

## 2025-03-14 DIAGNOSIS — I10 PRIMARY HYPERTENSION: ICD-10-CM

## 2025-03-14 LAB — POC HEMOGLOBIN A1C: 8.7 % (ref 4.2–6.5)

## 2025-03-14 PROCEDURE — 95251 CONT GLUC MNTR ANALYSIS I&R: CPT | Performed by: INTERNAL MEDICINE

## 2025-03-14 PROCEDURE — 83036 HEMOGLOBIN GLYCOSYLATED A1C: CPT | Performed by: INTERNAL MEDICINE

## 2025-03-14 PROCEDURE — 99214 OFFICE O/P EST MOD 30 MIN: CPT | Performed by: INTERNAL MEDICINE

## 2025-03-14 RX ORDER — INSULIN ASPART 100 [IU]/ML
INJECTION, SOLUTION INTRAVENOUS; SUBCUTANEOUS
Qty: 90 ML | Refills: 3 | Status: SHIPPED | OUTPATIENT
Start: 2025-03-14

## 2025-03-14 RX ORDER — LEVOTHYROXINE SODIUM 200 UG/1
200 TABLET ORAL DAILY
Qty: 90 TABLET | Refills: 1 | Status: SHIPPED | OUTPATIENT
Start: 2025-03-14

## 2025-03-14 RX ORDER — BENAZEPRIL HYDROCHLORIDE 10 MG/1
10 TABLET ORAL DAILY
Qty: 90 TABLET | Refills: 1 | Status: SHIPPED | OUTPATIENT
Start: 2025-03-14

## 2025-03-14 RX ORDER — BLOOD-GLUCOSE SENSOR
1 EACH MISCELLANEOUS
Qty: 9 EACH | Refills: 1 | Status: SHIPPED | OUTPATIENT
Start: 2025-03-14

## 2025-03-14 RX ORDER — ROSUVASTATIN CALCIUM 40 MG/1
40 TABLET, COATED ORAL DAILY
Qty: 90 TABLET | Refills: 1 | Status: SHIPPED | OUTPATIENT
Start: 2025-03-14 | End: 2026-03-14

## 2025-03-14 ASSESSMENT — PAIN SCALES - GENERAL: PAINLEVEL_OUTOF10: 0-NO PAIN

## 2025-03-14 NOTE — PATIENT INSTRUCTIONS
See pump settings page for changes made today    Stop atorvastatin  Start rosuvastatin 40 mg daily    Consider making a plan to quit smoking

## 2025-03-14 NOTE — PROGRESS NOTES
Attestation signed by Girish Still MD on 3/14/25 at 10:10 AM.    I, Dr Girish Still, have reviewed this progress note, medication list, vital signs, any pertinent lab values, and any CGM data if present with the Certified Diabetes [facility] and  face to face during this visit today. This note reflects the treatment plan that was made under my direction after reviewing the above mentioned elements while face to face with the patient and CDE.  I personally answered and addressed any questions and concerns the patient had during the visit today.  The CDE entered the data in this note under my direction and I personally reviewed it, signed any lab or medication orders that I instructed to be completed. I am the billing provider for this visit and the level of service was determined by my involvement in the Medical Decision Making Component of this visit while face to face with the patient.

## 2025-04-21 PROBLEM — Z96.41 INSULIN PUMP IN PLACE: Status: ACTIVE | Noted: 2025-04-21

## 2025-04-21 PROBLEM — I10 PRIMARY HYPERTENSION: Status: ACTIVE | Noted: 2025-04-21

## 2025-04-21 PROBLEM — E78.2 MIXED HYPERLIPIDEMIA: Status: ACTIVE | Noted: 2025-04-21

## 2025-04-21 PROBLEM — E66.811 CLASS 1 OBESITY DUE TO EXCESS CALORIES WITH SERIOUS COMORBIDITY AND BODY MASS INDEX (BMI) OF 32.0 TO 32.9 IN ADULT: Status: ACTIVE | Noted: 2023-09-15

## 2025-04-21 PROBLEM — E66.09 CLASS 1 OBESITY DUE TO EXCESS CALORIES WITH SERIOUS COMORBIDITY AND BODY MASS INDEX (BMI) OF 32.0 TO 32.9 IN ADULT: Status: ACTIVE | Noted: 2023-09-15

## 2025-04-21 PROBLEM — E10.9 TYPE 1 DIABETES MELLITUS: Status: ACTIVE | Noted: 2023-03-15

## 2025-04-21 NOTE — PROGRESS NOTES
HPI:  pt here for routine wellness exam w/o any issues    Pt  here to establish; transfer from [facility] [place].      PMH sig for type 1 dm, htn, hld, hypothyroidism taken care of by his endocrinologist    Colonoscopy due : + FH colon cancer in father at age 38.  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   Psa due: no FH cancer but never tested.    PMH:   Past Medical History:   Diagnosis Date    Autoimmune thyroiditis 09/15/2023    [place]    Class 1 obesity due to excess calories with serious comorbidity and body mass index (BMI) of 32.0 to 32.9 in adult 09/15/2023    Current smoker 09/15/2023    Facial numbness 09/15/2023    Facial weakness 09/15/2023    Hyperlipidemia     [place]    Hypoglycemia unawareness associated with type 1 diabetes mellitus 09/15/2023    Insulin pump in place 04/21/2025    [place]    Primary hypertension 04/21/2025    [place]    Type 1 diabetes mellitus with diabetic polyneuropathy 09/15/2023    [facility]     MEDICATIONS:   Current Outpatient Medications   Medication Sig Dispense Refill    aspirin 81 mg chewable tablet       benazepril (Lotensin) 10 mg tablet Take 1 tablet (10 mg) by mouth once daily. 90 tablet 1    blood-glucose sensor (Dexcom G6 Sensor) device USE AS DIRECTED 9 each 3    blood-glucose sensor (Dexcom G7 Sensor) device Inject 1 each under the skin every 10 (ten) days. 9 each 1    blood-glucose transmitter device (Dexcom G6 Transmitter) device AS DIRECTED EVERY 90 DAYS 1 each 3    insulin aspart (NovoLOG U-100 Insulin aspart) 100 unit/mL injection TAKE AS DIRECTED UP  UNITS DAILY USING INSULIN PUMP 90 mL 3    levothyroxine (Synthroid) 200 mcg tablet Take 1 tablet (200 mcg) by mouth early in the morning.. Take on an empty stomach at the same time each day, either 30 to 60 minutes prior to breakfast 90 tablet 1    OneTouch Ultra Test Test daily 200 strip 11    OneTouch Ultra2 Meter misc Test daily 1 each 1    OneTouch UltraSoft 2 Lancet 30 gauge misc USE 1 LANCET  "EVERY DAY TO TEST BLOOD SUGAR      rosuvastatin (Crestor) 40 mg tablet Take 1 tablet (40 mg) by mouth once daily. 90 tablet 1    sildenafil (Viagra) 100 mg tablet        No current facility-administered medications for this visit.     ALLERGIES:    Allergies   Allergen Reactions    Mounjaro [Tirzepatide] GI Upset     SOCIAL HX:    Social History     Tobacco Use    Smoking status: Every Day     Current packs/day: 1.00     Average packs/day: 1 pack/day for 31.4 years (31.4 ttl pk-yrs)     Types: Cigarettes     Start date: 1994    Smokeless tobacco: Never   Vaping Use    Vaping status: Never Used   Substance Use Topics    Alcohol use: Never    Drug use: Never     FAMILY HX:   Family History   Problem Relation Name Age of Onset    Hypothyroidism Mother      Colon cancer Father  38    No Known Problems Sister      No Known Problems Brother      No Known Problems Brother      No Known Problems Daughter      No Known Problems Son         ROS:             CONSTITUTIONAL:          Negative for concerns, fever, chills.         HEENT:          no Difficulty swallowing.  no Hearing loss.  no Poor sense of smell.   No change in vision ; no headaches     CARDIOLOGY:          Chest pain none.  Palpitations none.  Shortness of breath none.         RESPIRATORY:          Negative for persistent cough , wheezing, trouble breathing.         GASTROENTEROLOGY:          Negative for abdominal pain, change in bowel habits.         ENDOCRINOLOGY:          Negative for fatigue, polydipsia, polyuria, weight loss, weight gain, cold intolerance, heat intolerance.         MUSCULOSKELETAL:          Negative for myalgias, joint pain.         DERMATOLOGY:          Negative for rash, bruising, moles.         NEUROLOGY:          Negative for weakness, headache, dizziness.     VITALS: /68   Pulse 72   Ht 1.88 m (6' 2\")   Wt 115 kg (254 lb 3.2 oz)   SpO2 99%   BMI 32.64 kg/m²      PE:  General Appearance: awake, alert, oriented, in no acute " distress  Skin: there are no suspicious lesions or rashes of concern  Head/Face: NCAT  Eyes: No gross abnormalities., PERRL, and EOMI  Ears: canals and TMs NI  Mouth/Throat: Mucosa moist, no lesions; pharynx without erythema, edema or exudate.  Neck: neck- supple, no mass, non-tender  Lungs: Normal expansion.  Clear to auscultation.  No rales, rhonchi, or wheezing.  Heart: Heart sounds are normal.  Regular rate and rhythm without murmur, gallop or rub.  Abdomen: Soft, non-tender, normal bowel sounds; no bruits, organomegaly or masses.  Extremities: Extremities warm to touch, pink, with no edema.  Musculoskeletal: Range of motion normal in hips, knees, shoulders, and spine., No joint swelling, deformity, or tenderness.  Neurologic: Alert and oriented x 3, gait normal., reflexes normal and symmetric, strength and  sensation grossly normal    Sukhjinder was seen today for annual exam.  Diagnoses and all orders for this visit:  Well adult exam (Primary)  Screening for colon cancer  -     Colonoscopy Screening; High Risk Patient; father with colon cancer at age 38; Future  Screening for prostate cancer  -     Prostate Specific Antigen, Screen; Future  -     Prostate Specific Antigen, Screen  Need for vaccination  -     Pneumococcal conjugate vaccine, 20-valent (PREVNAR 20)  -     Tdap vaccine, age 7 years and older  (BOOSTRIX)

## 2025-04-28 ENCOUNTER — TELEPHONE (OUTPATIENT)
Dept: ENDOCRINOLOGY | Facility: CLINIC | Age: 50
End: 2025-04-28
Payer: COMMERCIAL

## 2025-04-28 DIAGNOSIS — E10.42 TYPE 1 DIABETES MELLITUS WITH DIABETIC POLYNEUROPATHY: Primary | ICD-10-CM

## 2025-04-28 RX ORDER — LANCETS
EACH MISCELLANEOUS
Qty: 200 EACH | Refills: 11 | Status: SHIPPED | OUTPATIENT
Start: 2025-04-28

## 2025-04-28 RX ORDER — BLOOD SUGAR DIAGNOSTIC
STRIP MISCELLANEOUS
Qty: 200 STRIP | Refills: 11 | Status: SHIPPED | OUTPATIENT
Start: 2025-04-28

## 2025-04-28 RX ORDER — LANCETS 30 GAUGE
EACH MISCELLANEOUS
Qty: 1 EACH | Refills: 1 | Status: SHIPPED | OUTPATIENT
Start: 2025-04-28

## 2025-05-09 ENCOUNTER — OFFICE VISIT (OUTPATIENT)
Dept: PRIMARY CARE | Facility: CLINIC | Age: 50
End: 2025-05-09
Payer: COMMERCIAL

## 2025-05-09 VITALS
OXYGEN SATURATION: 99 % | WEIGHT: 254.2 LBS | HEART RATE: 72 BPM | SYSTOLIC BLOOD PRESSURE: 118 MMHG | HEIGHT: 74 IN | BODY MASS INDEX: 32.62 KG/M2 | DIASTOLIC BLOOD PRESSURE: 68 MMHG

## 2025-05-09 DIAGNOSIS — Z12.11 SCREENING FOR COLON CANCER: ICD-10-CM

## 2025-05-09 DIAGNOSIS — Z23 NEED FOR VACCINATION: ICD-10-CM

## 2025-05-09 DIAGNOSIS — Z12.5 SCREENING FOR PROSTATE CANCER: ICD-10-CM

## 2025-05-09 DIAGNOSIS — Z00.00 WELL ADULT EXAM: Primary | ICD-10-CM

## 2025-05-09 PROCEDURE — 4010F ACE/ARB THERAPY RXD/TAKEN: CPT | Performed by: FAMILY MEDICINE

## 2025-05-09 PROCEDURE — 3074F SYST BP LT 130 MM HG: CPT | Performed by: FAMILY MEDICINE

## 2025-05-09 PROCEDURE — 90471 IMMUNIZATION ADMIN: CPT | Performed by: FAMILY MEDICINE

## 2025-05-09 PROCEDURE — 3008F BODY MASS INDEX DOCD: CPT | Performed by: FAMILY MEDICINE

## 2025-05-09 PROCEDURE — 3052F HG A1C>EQUAL 8.0%<EQUAL 9.0%: CPT | Performed by: FAMILY MEDICINE

## 2025-05-09 PROCEDURE — 90472 IMMUNIZATION ADMIN EACH ADD: CPT | Performed by: FAMILY MEDICINE

## 2025-05-09 PROCEDURE — 90715 TDAP VACCINE 7 YRS/> IM: CPT | Performed by: FAMILY MEDICINE

## 2025-05-09 PROCEDURE — 99396 PREV VISIT EST AGE 40-64: CPT | Performed by: FAMILY MEDICINE

## 2025-05-09 PROCEDURE — 3078F DIAST BP <80 MM HG: CPT | Performed by: FAMILY MEDICINE

## 2025-05-09 PROCEDURE — 4004F PT TOBACCO SCREEN RCVD TLK: CPT | Performed by: FAMILY MEDICINE

## 2025-05-09 PROCEDURE — 90677 PCV20 VACCINE IM: CPT | Performed by: FAMILY MEDICINE

## 2025-05-09 RX ORDER — LANCETS 30 GAUGE
EACH MISCELLANEOUS
COMMUNITY
Start: 2025-04-29

## 2025-05-09 ASSESSMENT — PAIN SCALES - GENERAL: PAINLEVEL_OUTOF10: 0-NO PAIN

## 2025-05-09 ASSESSMENT — PATIENT HEALTH QUESTIONNAIRE - PHQ9
1. LITTLE INTEREST OR PLEASURE IN DOING THINGS: NOT AT ALL
SUM OF ALL RESPONSES TO PHQ9 QUESTIONS 1 AND 2: 0
2. FEELING DOWN, DEPRESSED OR HOPELESS: NOT AT ALL

## 2025-05-10 LAB — PSA SERPL-MCNC: 0.26 NG/ML

## 2025-08-18 DIAGNOSIS — E78.00 ELEVATED CHOLESTEROL: ICD-10-CM

## 2025-08-18 RX ORDER — ROSUVASTATIN CALCIUM 40 MG/1
40 TABLET, COATED ORAL DAILY
Qty: 90 TABLET | Refills: 1 | Status: SHIPPED | OUTPATIENT
Start: 2025-08-18

## 2025-08-29 DIAGNOSIS — E10.42 TYPE 1 DIABETES MELLITUS WITH DIABETIC POLYNEUROPATHY: ICD-10-CM

## 2025-08-29 RX ORDER — BLOOD-GLUCOSE SENSOR
EACH MISCELLANEOUS
Qty: 3 EACH | Refills: 5 | Status: SHIPPED | OUTPATIENT
Start: 2025-08-29

## 2025-09-16 ENCOUNTER — APPOINTMENT (OUTPATIENT)
Dept: ENDOCRINOLOGY | Facility: CLINIC | Age: 50
End: 2025-09-16
Payer: COMMERCIAL